# Patient Record
Sex: FEMALE | Employment: FULL TIME | URBAN - METROPOLITAN AREA
[De-identification: names, ages, dates, MRNs, and addresses within clinical notes are randomized per-mention and may not be internally consistent; named-entity substitution may affect disease eponyms.]

---

## 2019-02-01 ENCOUNTER — OFFICE VISIT (OUTPATIENT)
Dept: ORTHOPEDIC SURGERY | Age: 39
End: 2019-02-01

## 2019-02-01 VITALS
HEIGHT: 64 IN | DIASTOLIC BLOOD PRESSURE: 87 MMHG | HEART RATE: 81 BPM | SYSTOLIC BLOOD PRESSURE: 122 MMHG | WEIGHT: 182 LBS | BODY MASS INDEX: 31.07 KG/M2

## 2019-02-01 DIAGNOSIS — M48.061 FORAMINAL STENOSIS OF LUMBAR REGION: ICD-10-CM

## 2019-02-01 DIAGNOSIS — M54.9 MECHANICAL BACK PAIN: Primary | ICD-10-CM

## 2019-02-01 DIAGNOSIS — M54.41 ACUTE BILATERAL LOW BACK PAIN WITH RIGHT-SIDED SCIATICA: ICD-10-CM

## 2019-02-01 RX ORDER — CYCLOBENZAPRINE HCL 10 MG
1 TABLET ORAL
COMMUNITY

## 2019-02-01 RX ORDER — IBUPROFEN 800 MG/1
TABLET ORAL
Refills: 0 | COMMUNITY
Start: 2018-11-07

## 2019-02-01 RX ORDER — BUPROPION HYDROCHLORIDE 300 MG/1
1 TABLET ORAL
COMMUNITY

## 2019-02-01 RX ORDER — GABAPENTIN 300 MG/1
300 CAPSULE ORAL 3 TIMES DAILY
Qty: 90 CAP | Refills: 1 | Status: SHIPPED | OUTPATIENT
Start: 2019-02-01 | End: 2019-02-26 | Stop reason: SDUPTHER

## 2019-02-01 RX ORDER — LEVOTHYROXINE SODIUM 75 UG/1
1 TABLET ORAL
COMMUNITY

## 2019-02-01 NOTE — PROGRESS NOTES
without assistive device    [facility] [address].  [address], [address]  [place] [address]  Phone: [phone]  Fax: [phone]  INITIAL CONSULTATION  Patient: Chey Guzman                MRN: [id number]       SSN: [id number]  YOB: 1980        AGE: 45 y.o. SEX: female  Body mass index is 31.24 kg/m². PCP: Cinthia Sage MD  02/01/19    Chief Complaint   Patient presents with    Back Pain     lumbar BLE pain to ankles. hx of L5-S1 lami/disc 2007. worsening since Autumn 2018         HISTORY OF PRESENT ILLNESS, RADIOGRAPHS, and PLAN:         HISTORY OF PRESENT ILLNESS:  Surgical consultation. Ms. Terell Patel is seen today on self-referral and the request of Dr. Janessa Waldrop. The patient is a 70-year-old female, generally healthy, does desk work. She had a lumbar laminectomy by Dr. Tata Navarro about a decade ago. She did well from that. It was an L5-S1 laminectomy. For about the past 5 months, she has had progressive pain, initially in her left leg, then her right leg and it alternates, but currently it is more in her right leg with mechanical back pain. It is severe and debilitating. She rates it currently at about a 6. 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 She denies bowel or bladder dysfunction, fevers, chills, night sweats, weight loss or weight gain. She was put on some nonsteroidal, has had no physical therapy. She recently had an epidural injection, which gave her a violent headache, thought it was a CSF leak, clearly unhappy with it. She denies any other issues. PHYSICAL EXAMINATION:  Her exam demonstrates normal strength, sensation, and reflexes. No nerve tension signs. Pain on extension, facet signs. RADIOGRAPHS:  I have reviewed radiographs of her lumbar spine. AP, lateral, flexion, extension x-rays demonstrate mild degenerative change at L5-S1 without instability.   MRI of the lumbar spine demonstrate left L5-S1 laminectomy changes, annular changes at L5-S1 consistent with an old laminectomy, some foraminal stenosis bilaterally. No gross instability or recurrent disc herniation. ASSESSMENT/PLAN:  The patient has primarily mechanical back pain associated with alternating radicular leg pain. I think she has a combination of facet irritation at L5-S1 with maybe radiculopathy from an annular tear. It is difficult to say. She has had no physical therapy. She has been put on no neuropathics. Clearly, epidural steroids have been ineffective for her. At this point, my recommendation would be start a physical therapy program, try her on some neuropathics to help her with her leg symptomatology to see if we can calm that down. I do not see an urgent surgical issue here. She has no critical stenosis or instability present. She may have a discogenic pain syndrome with foraminal stenosis and annular tear issues giving rise to load intolerant disc, but it is early in the process and I would treat it conservatively and aggressively as possible. I will see her back after a trial of conservative care. History reviewed. No pertinent past medical history. History reviewed. No pertinent family history. Current Outpatient Medications   Medication Sig Dispense Refill    buPROPion XL (WELLBUTRIN XL) 300 mg XL tablet Take 1 Tab by mouth.  cyclobenzaprine (FLEXERIL) 10 mg tablet Take 1 Tab by mouth.  ibuprofen (MOTRIN) 800 mg tablet TAKE 1 TABLET BY MOUTH EVERY 8 HOURS AS NEEDED  0    levothyroxine (SYNTHROID) 75 mcg tablet Take 1 Tab by mouth. Allergies   Allergen Reactions    Shellfish Derived Nausea and Vomiting       Past Surgical History:   Procedure Laterality Date    HX HYSTERECTOMY  2013    HX LUMBAR LAMINECTOMY  2007    discectomy L5-S1       History reviewed. No pertinent past medical history.     Social History     Socioeconomic History    Marital status: SINGLE Spouse name: Not on file    Number of children: Not on file    Years of education: Not on file    Highest education level: Not on file   Social Needs    Financial resource strain: Not on file    Food insecurity - worry: Not on file    Food insecurity - inability: Not on file    Transportation needs - medical: Not on file   Exiles needs - non-medical: Not on file   Occupational History    Not on file   Tobacco Use    Smoking status: Never Smoker    Smokeless tobacco: Never Used   Substance and Sexual Activity    Alcohol use: Yes    Drug use: Not on file    Sexual activity: Not on file   Other Topics Concern     Service Not Asked    Blood Transfusions Not Asked    Caffeine Concern Not Asked    Occupational Exposure Not Asked    Hobby Hazards Not Asked    Sleep Concern Not Asked    Stress Concern Not Asked    Weight Concern Not Asked    Special Diet Not Asked    Back Care Not Asked    Exercise Not Asked    Bike Helmet Not Asked   [address] Not Asked    Self-Exams Not Asked   Social History Narrative    Not on file           REVIEW OF SYSTEMS:   CONSTITUTIONAL SYMPTOMS:  Negative. EYES:  Negative. EARS, NOSE, THROAT AND MOUTH:  Negative. CARDIOVASCULAR:  Negative. RESPIRATORY:  Negative. GENITOURINARY: Per HPI. GASTROINTESTINAL:  Per HPI. INTEGUMENTARY (SKIN AND/OR BREAST):  Negative. MUSCULOSKELETAL: Per HPI.   ENDOCRINE/RHEUMATOLOGIC:  Negative. NEUROLOGICAL:  Per HPI. HEMATOLOGIC/LYMPHATIC:  Negative. ALLERGIC/IMMUNOLOGIC:  Negative. PSYCHIATRIC:  Negative. PHYSICAL EXAMINATION:   Visit Vitals  /87   Pulse 81   Ht 5' 4\" (1.626 m)   Wt 182 lb (82.6 kg)   BMI 31.24 kg/m²    PAIN SCALE: 6/10    CONSTITUTIONAL: The patient is in no apparent distress and is alert and oriented x 3. HEENT: Normocephalic. Hearing grossly intact. NECK: Supple and symmetric. no tenderness, or masses were felt.   RESPIRATORY: No labored breathing. CARDIOVASCULAR: The carotid pulses were normal. Peripheral pulses were 2+. CHEST: Normal AP diameter and normal contour without any kyphoscoliosis. LYMPHATIC: No lymphadenopathy was appreciated in the neck, axillae or groin. SKIN:  Negative for scars, rashes, lesions, or ulcers on the right upper, right lower, left upper, left lower and trunk. NEUROLOGICAL: Alert and oriented x 3. Ambulation without assistive device. FWB. EXTREMITIES:  See musculoskeletal.  MUSCULOSKELETAL:   Head and Neck:  Negative for misalignment, asymmetry, crepitation, defects, tenderness masses or effusions.  Left Upper Extremity: Inspection, percussion and palpation performed. Archuletas sign is negative.  Right Upper Extremity: Inspection, percussion and palpation performed. Archuletas sign is negative.  Spine, Ribs and Pelvis: Back pain radiating into BLE. Inspection, percussion and palpation performed. Negative for misalignment, asymmetry, crepitation, defects, tenderness masses or effusions.  Left Lower Extremity: Inspection, percussion and palpation performed. Negative straight leg raise.  Right Lower Extremity:Sciatica R>L. Numbness and weakness. Inspection, percussion and palpation performed. Negative straight leg raise. SPINE EXAM:     Cervical spine: Neck is midline. Normal muscle tone. No focal atrophy is noted. Lumbar spine: No rash, ecchymosis, or gross obliquity. No focal atrophy is noted. ASSESSMENT    ICD-10-CM ICD-9-CM    1. Foraminal stenosis of lumbar region M99.83 724.02 gabapentin (NEURONTIN) 300 mg capsule      REFERRAL TO PHYSICAL THERAPY   2. Acute bilateral low back pain with right-sided sciatica M54.41 724.2 gabapentin (NEURONTIN) 300 mg capsule     724.3 REFERRAL TO PHYSICAL THERAPY     338.19        Written by Lisandra Bill, as dictated by Oliva Pepper MD.    I, Dr. Oliva Pepper MD, confirm that all documentation is accurate.

## 2019-02-14 ENCOUNTER — HOSPITAL ENCOUNTER (OUTPATIENT)
Dept: PHYSICAL THERAPY | Age: 39
Discharge: HOME | End: 2019-02-14
Payer: COMMERCIAL

## 2019-02-14 PROCEDURE — 97162 PT EVAL MOD COMPLEX 30 MIN: CPT

## 2019-02-14 PROCEDURE — 97110 THERAPEUTIC EXERCISES: CPT

## 2019-02-14 NOTE — PROGRESS NOTES
PHYSICAL THERAPY - DAILY TREATMENT NOTE Patient Name: Amado Wade        Date: 2019 : 1980   yes Patient  Verified Visit #:      [id number]  Insurance: Payor: BLUE CROSS / Plan: [facility] PPO / Product Type: PPO / In time: 3:40 Out time: 4:30 Total Treatment Time: 50 Medicare/BCBS Time Tracking (below) Total Timed Codes (min):  25 1:1 Treatment Time:  25 TREATMENT AREA =  Acute bilateral low back pain with right-sided sciatica [M54.41] SUBJECTIVE Pain Level (on 0 to 10 scale):  0  / 10 Medication Changes/New allergies or changes in medical history, any new surgeries or procedures?    no  If yes, update Summary List  
Subjective Functional Status/Changes:  []  No changes reported See POC OBJECTIVE 25 min Therapeutic Exercise:  [x]  See flow sheet Rationale:      increase ROM, increase strength and improve coordination to improve the patients ability to stand for prolonged periods Billed With/As: 
 [x] TE 
 [] TA 
 [] Neuro 
 [] Self Care Patient Education: [x] Review HEP [] Progressed/Changed HEP based on:  
[] positioning   [] body mechanics   [] transfers   [] heat/ice application   
[] other:   
Other Objective/Functional Measures: 
 
See POC Post Treatment Pain Level (on 0 to 10) scale:   0  / 10 ASSESSMENT Assessment/Changes in Function:  
 
See POC []  See Progress Note/Recertification Patient will continue to benefit from skilled PT services to modify and progress therapeutic interventions, address functional mobility deficits, address ROM deficits, address strength deficits and analyze and cue movement patterns to attain remaining goals. Progress toward goals / Updated goals: 
Short term goals 1. Patient to be (I) and compliant with Initial HEP to prevent further disability. 2. Patient to improve FOTO scores by 5 points to improve functional disability. 3. Pt to improve lumbar ROM by 20% to improve bending tolerance.  
  
Long term Goals 1. Pt to be (I) and compliant with progressive HEP in preparation for D/C.  
 
2. Pt to increase standing tolerance to 60 minutes to improve functional disability. 3. Pt to reports pain level no greater than 5/10 at end of day PLAN 
[]  Upgrade activities as tolerated yes Continue plan of care  
[]  Discharge due to :   
[]  Other:   
 
Therapist: Garth Givens PT Date: 2/14/2019 Time: 3:39 PM  
 
No future appointments.

## 2019-02-14 NOTE — PROGRESS NOTES
[facility] at [address]: [phone]   Fax: [phone] PLAN OF CARE / STATEMENT OF MEDICAL NECESSITY FOR PHYSICAL THERAPY SERVICES Patient Name: Marisa Munguia : 1980 Medical  
Diagnosis: Acute bilateral low back pain with right-sided sciatica [M54.41] Treatment Diagnosis: Acute bilateral low back pain with right-sided sciatica [M54.4) Onset Date: acute Referral Source: Alicia Santana MD [facility]): 2019 Prior Hospitalization: See medical history Provider #: [id number] Prior Level of Function: Functionally (I) Comorbidities: Weight change greater than 10 pounds, arthritis, thyroid problems, depression Medications: Verified on Patient Summary List  
The Plan of Care and following information is based on the information from the initial evaluation.  
=========================================================================================== Assessment / chen information:  Marisa Munguia is a 45 y.o.  yo female presents with  LBP. Pt reports her symptoms began 5 months ago. Pt had lumbar surgery in 07 with radicular symptoms B. Pt had injections on 19 with minimal improvement and complications (spinal fluid leak per patient). Symptoms are worsened by prolonged sitting and alleviated by rest.  Average reported pain levels are 4/10, 7/10 at worst, and 3/10 at best. Pt reports imaging showed \"arthritis\" and herniated disc. Pt reports history of constipation, night sweats since onset of recent back pain, hyper-reflexive on R, and inconsistently positive hoffmans. Pt reports her leg pain is worsened with extension, but is painful with repeated flexion. Upon objective evaluation patient demonstrates decreased ROM, decreased strength.   Pt would benefit from skilled PT to address her objective and functional limitations to improve ROM, strength, posture, and decrease pain to improve her ability to walk for prolonged periods at work/home. Pt instructed in HEP and will f/u in clinic for PT. 
=========================================================================================== Eval Complexity: History HIGH Complexity :3+ comorbidities / personal factors will impact the outcome/ POC ;  Examination  HIGH Complexity : 4+ Standardized tests and measures addressing body structure, function, activity limitation and / or participation in recreation ; Presentation MEDIUM Complexity : Evolving with changing characteristics ; Decision Making MEDIUM Complexity : FOTO score of 26-74; Overall Complexity MEDIUM Problem List: pain affecting function, decrease ROM, decrease strength, edema affecting function and impaired gait/ balance Treatment Plan may include any combination of the following: Therapeutic exercise, Therapeutic activities, Neuromuscular re-education, Physical agent/modality and Gait/balance training Patient / Family readiness to learn indicated by: asking questions, trying to perform skills and interest 
Persons(s) to be included in education: patient (P) Barriers to Learning/Limitations: no 
Patient Goal (s): Get rid of pain Patient self reported health status: fair Rehabilitation Potential: good Goals: 
Short term goals 1. Patient to be (I) and compliant with Initial HEP to prevent further disability. 2. Patient to improve FOTO scores by 5 points to improve functional disability. 3. Pt to improve lumbar ROM by 20% to improve bending tolerance. Long term Goals 1. Pt to be (I) and compliant with progressive HEP in preparation for D/C.  
2. Pt to increase standing tolerance to 60 minutes to improve functional disability. 3. Pt to reports pain level no greater than 5/10 at end of day. Frequency / Duration:   Patient to be seen 2-3  times per week for 4-6  weeks: 
Patient / Caregiver education and instruction: self care and exercises . 
Therapist Signature: Freida Tanner DPT  Date: 2/14/2019 Time: 3:30 PM  
=========================================================================================== I certify that the above Physical Therapy Services are being furnished while the patient is under my care. I agree with the treatment plan and certify that this therapy is necessary. Physician Signature:        Date:       Time:    
Please sign and return to [facility] or you may fax the signed copy to [phone]. Thank you.

## 2019-02-26 ENCOUNTER — APPOINTMENT (OUTPATIENT)
Dept: PHYSICAL THERAPY | Age: 39
End: 2019-02-26
Payer: COMMERCIAL

## 2019-02-26 DIAGNOSIS — M54.41 ACUTE BILATERAL LOW BACK PAIN WITH RIGHT-SIDED SCIATICA: ICD-10-CM

## 2019-02-26 DIAGNOSIS — M48.061 FORAMINAL STENOSIS OF LUMBAR REGION: ICD-10-CM

## 2019-02-26 RX ORDER — GABAPENTIN 300 MG/1
300 CAPSULE ORAL 3 TIMES DAILY
Qty: 270 CAP | Refills: 0 | Status: SHIPPED | OUTPATIENT
Start: 2019-02-26

## 2019-02-26 NOTE — TELEPHONE ENCOUNTER
[facility] Requests A 90 day supply on the patients behalf. Last Visit: 2/1/19  Next Appt: RTC 3/15  Previous Refill Encounter: 2/1-90+1    Requested Prescriptions     Pending Prescriptions Disp Refills    gabapentin (NEURONTIN) 300 mg capsule 270 Cap 0     Sig: Take 1 Cap by mouth three (3) times daily.

## 2019-02-28 ENCOUNTER — APPOINTMENT (OUTPATIENT)
Dept: PHYSICAL THERAPY | Age: 39
End: 2019-02-28
Payer: COMMERCIAL

## 2019-03-04 ENCOUNTER — APPOINTMENT (OUTPATIENT)
Dept: PHYSICAL THERAPY | Age: 39
End: 2019-03-04

## 2019-03-06 ENCOUNTER — APPOINTMENT (OUTPATIENT)
Dept: PHYSICAL THERAPY | Age: 39
End: 2019-03-06

## 2019-03-11 ENCOUNTER — APPOINTMENT (OUTPATIENT)
Dept: PHYSICAL THERAPY | Age: 39
End: 2019-03-11

## 2019-03-13 ENCOUNTER — APPOINTMENT (OUTPATIENT)
Dept: PHYSICAL THERAPY | Age: 39
End: 2019-03-13

## 2019-03-18 ENCOUNTER — APPOINTMENT (OUTPATIENT)
Dept: PHYSICAL THERAPY | Age: 39
End: 2019-03-18

## 2019-03-20 ENCOUNTER — APPOINTMENT (OUTPATIENT)
Dept: PHYSICAL THERAPY | Age: 39
End: 2019-03-20

## 2019-09-03 NOTE — PROGRESS NOTES
Discussed in detail re: nature of condition, dry vs wet AMD. [facility] at [address] [place] [address] Drive: [phone]   Fax: [phone]  Discharge Summary  Patient Name: Brie Soriano : 1980   Medical   Diagnosis: Acute bilateral low back pain with right-sided sciatica [M54.41] Treatment Diagnosis: Low back pain   Onset Date: acute     Referral Source: Brie Thurston MD Start of Care [facility]): 2019   Prior Hospitalization: See medical history Provider #: [id number]   Prior Level of Function: Independent functionally   Comorbidities: OA, depression,    Medications: Verified on Patient Summary List      ===========================================================================================  Assessment / Summary of Care:  Brie Soriano is a 44 y.o.  yo female with low back pain. Patient has not returned to clinic in 30 days. Unable to perform formal assessment on patient as a result.     ===========================================================================================    Plan: Discharge to [facility].     Goals: Unable to reassess.    ===========================================================================================  Subjective: NA      Objective: NA    Therapist Signature: Lina Armstrong PT, DPT, OCS Date: 9/3/2019     Time: 10:19 AM